# Patient Record
Sex: FEMALE | Race: WHITE
[De-identification: names, ages, dates, MRNs, and addresses within clinical notes are randomized per-mention and may not be internally consistent; named-entity substitution may affect disease eponyms.]

---

## 2018-07-11 ENCOUNTER — HOSPITAL ENCOUNTER (EMERGENCY)
Dept: HOSPITAL 80 - FED | Age: 18
Discharge: HOME | End: 2018-07-11
Payer: COMMERCIAL

## 2018-07-11 VITALS — SYSTOLIC BLOOD PRESSURE: 107 MMHG | DIASTOLIC BLOOD PRESSURE: 65 MMHG

## 2018-07-11 DIAGNOSIS — B34.9: Primary | ICD-10-CM

## 2018-07-11 DIAGNOSIS — E86.9: ICD-10-CM

## 2018-07-11 LAB — PLATELET # BLD: 121 10^3/UL (ref 150–400)

## 2018-07-11 NOTE — EDPHY
HPI/HX/ROS/PE/MDM


Narrative: 





CHIEF COMPLAINT: Headache, fever. 





HPI: 


This patient is a healthy 18 year old female. She complains of fever onset 

three days ago. She has associated headaches and hot and cold flashes. She 

feels fatigued. She denies any observed skin rashes. Her sister had similar 

symptoms last week, but the patient is not sure what her sister had, stating it 

was "some virus". Additionally, the patient reports that she has been drinking 

water frequently but still feels dehydrated. She denies vomiting, diarrhea, 

urinary complaints, cough, chest pain, shortness of breath, or other associated 

symptoms. 





REVIEW OF SYSTEMS:


Aside from elements discussed in the HPI, a comprehensive 10-point review of 

systems was reviewed and is negative.





PMH: Denies.





SOCIAL HISTORY: Visiting from [place]. Works as a . 





PHYSICAL EXAM:


General:Patient is alert, in no acute distress.


ENT:Eyes are normal to inspection.  ENT inspection normal.


Neck: Normal inspection.  Full range of motion.


Respiratory:No respiratory distress.  Breath sounds normal bilaterally.


Cardiovascular: Regular rate and rhythm.  Strong peripheral pulses.  Normal cap 

refill.


Abdomen:The abdomen is nontender to palpation. There are no peritoneal signs. 

There are normal bowel sounds.


Back: Normal to inspection.  No tenderness to palpation.


Skin: Normal color.  No rash.  Warm and dry.


Extremities: Normal appearance.  Full range of motion.


Neuro: Oriented x3.  Normal motor function.  Normal sensory function.





ED Course: 





19 y/o female presents with 3 days history of fever and headache. No neck 

stiffness or photophobia on exam. Plan for labs including CBC, chemistries, flu 

swab, BHCG. Plan to administer 30mg IV Toradol and 1L IVF for symptom relief. 





Flu swab negative. Labs otherwise unremarkable. 





18:57 Reassessed. Patient is feeling better after medications and IVF. Plan to 

discharge home in good condition. Follow up and return precautions discussed. 

She is comfortable with this plan. 


MDM: 





This is a healthy young female who presents with signs and symptoms of a viral 

syndrome.  She has no focal complaints or positive workup to suggest pneumonia, 

UTI.  Her abdomen is completely benign and she denies abdominal pain, making 

appendicitis or other abdominal pathology unlikely.  She has very clear absence 

of any meningeal signs to suggest meningitis.  Her influenza swab is negative 

but I suspect another virus is likely a workup.  The patient was treated with 

Toradol and IV fluids and feels much better.  She declines further workup.  We 

discussed strict return precautions.





- Data Points


Laboratory Results: 


 Laboratory Results





 07/11/18 16:51 





 07/11/18 16:51 





 











  07/11/18 07/11/18 07/11/18





  17:46 17:14 16:51


 


WBC      





    


 


RBC      





    


 


Hgb      





    


 


Hct      





    


 


MCV      





    


 


MCH      





    


 


MCHC      





    


 


RDW      





    


 


Plt Count      





    


 


MPV      





    


 


Neut % (Auto)      





    


 


Lymph % (Auto)      





    


 


Mono % (Auto)      





    


 


Eos % (Auto)      





    


 


Baso % (Auto)      





    


 


Nucleat RBC Rel Count      





    


 


Absolute Neuts (auto)      





    


 


Absolute Lymphs (auto)      





    


 


Absolute Monos (auto)      





    


 


Absolute Eos (auto)      





    


 


Absolute Basos (auto)      





    


 


Absolute Nucleated RBC      





    


 


Immature Gran %      





    


 


Seg Neutrophils %      





    


 


Band Neutrophils %      





    


 


Lymphocytes %      





    


 


Monocytes %      





    


 


Eosinophils %      





    


 


Basophils %      





    


 


Metamyelocytes %      





    


 


Myelocytes %      





    


 


Promyelocytes %      





    


 


Blast Cells %      





    


 


Immature Gran #      





    


 


Absolute Seg Neuts      





    


 


Absolute Band Neuts      





    


 


Absolute Lymphocytes      





    


 


Absolute Monocytes      





    


 


Absolute Eosinophils      





    


 


Absolute Basophils      





    


 


Absolute Metamyelocyte      





    


 


Absolute Myelocytes      





    


 


Absolute Promyelocytes      





    


 


Absolute Plasma Cells      





    


 


Nucleated RBCs      





    


 


RBC/WBC/PLT Morphology      





    


 


Absolute Blast Cells      





    


 


Plasma Cells %      





    


 


Platelet Estimate      





    


 


Sodium      136 mEq/L mEq/L





     (135-145) 


 


Potassium      4.1 mEq/L mEq/L





     (3.3-5.0) 


 


Chloride      107 mEq/L mEq/L





     () 


 


Carbon Dioxide      16 mEq/l L mEq/l





     (22-31) 


 


Anion Gap      13 mEq/L mEq/L





     (8-16) 


 


BUN      9 mg/dL mg/dL





     (7-23) 


 


Creatinine      0.8 mg/dL mg/dL





     (0.6-1.0) 


 


Estimated GFR      > 60 





    


 


Glucose      78 mg/dL mg/dL





     () 


 


Calcium      9.1 mg/dL mg/dL





     (8.5-10.4) 


 


Beta HCG, Qual    NEGATIVE   





    


 


Specimen Hemolysis      107 





    


 


Nasal Influenza A PCR  NEGATIVE FOR FLU A     





   (NEGATIVE)   


 


Nasal Influenza B PCR  NEGATIVE FOR FLU B     





   (NEGATIVE)   














  07/11/18





  16:51


 


WBC  3.82 10^3/uL 10^3/uL





   (3.80-9.50) 


 


RBC  4.38 10^6/uL 10^6/uL





   (4.18-5.33) 


 


Hgb  13.6 g/dL g/dL





   (12.6-16.3) 


 


Hct  39.7 % %





   (38.0-47.0) 


 


MCV  90.6 fL fL





   (81.5-99.8) 


 


MCH  31.1 pg pg





   (27.9-34.1) 


 


MCHC  34.3 g/dL g/dL





   (32.4-36.7) 


 


RDW  12.8 % %





   (11.5-15.2) 


 


Plt Count  121 10^3/uL L 10^3/uL





   (150-400) 


 


MPV  11.4 fL fL





   (8.7-11.7) 


 


Neut % (Auto)  Not Reported 





  


 


Lymph % (Auto)  Not Reported 





  


 


Mono % (Auto)  Not Reported 





  


 


Eos % (Auto)  Not Reported 





  


 


Baso % (Auto)  Not Reported 





  


 


Nucleat RBC Rel Count  Not Reported 





  


 


Absolute Neuts (auto)  Not Reported 





  


 


Absolute Lymphs (auto)  Not Reported 





  


 


Absolute Monos (auto)  Not Reported 





  


 


Absolute Eos (auto)  Not Reported 





  


 


Absolute Basos (auto)  Not Reported 





  


 


Absolute Nucleated RBC  Not Reported 





  


 


Immature Gran %  Not Reported 





  


 


Seg Neutrophils %  58.0 % %





  


 


Band Neutrophils %  24.0 % %





  


 


Lymphocytes %  8.0 % %





  


 


Monocytes %  10.0 % %





  


 


Eosinophils %  0 % %





  


 


Basophils %  0 % %





  


 


Metamyelocytes %  0 % %





  


 


Myelocytes %  0 % %





  


 


Promyelocytes %  0 % %





  


 


Blast Cells %  0 % %





  


 


Immature Gran #  Not Reported 





  


 


Absolute Seg Neuts  2.22 10^/uL 10^/uL





   (1.70-6.50) 


 


Absolute Band Neuts  0.92 10^3/uL H 10^3/uL





   (0.00-0.70) 


 


Absolute Lymphocytes  0.31 10^3/uL L 10^3/uL





   (1.00-3.00) 


 


Absolute Monocytes  0.38 10^3/uL 10^3/uL





   (0.30-0.80) 


 


Absolute Eosinophils  0.00 10^3/uL L 10^3/uL





   (0.03-0.40) 


 


Absolute Basophils  0.00 10^3/uL L 10^3/uL





   (0.02-0.10) 


 


Absolute Metamyelocyte  0.00 10^3/mL 10^3/mL





   (0.00-0.00) 


 


Absolute Myelocytes  0.00 10^3/mL 10^3/mL





   (0.00-0.00) 


 


Absolute Promyelocytes  0.00 10^3/uL 10^3/uL





   (0.00-0.00) 


 


Absolute Plasma Cells  0.00 10^3/uL 10^3/uL





   (0.00-0.00) 


 


Nucleated RBCs  0 /100 WBC /100 WBC





   (0-0) 


 


RBC/WBC/PLT Morphology  NORMAL 





   (NORMAL) 


 


Absolute Blast Cells  0.00 10^3/uL 10^3/uL





   (0.00-0.00) 


 


Plasma Cells %  0 % %





  


 


Platelet Estimate  DECREASED  L 





   (ADEQ) 


 


Sodium  





  


 


Potassium  





  


 


Chloride  





  


 


Carbon Dioxide  





  


 


Anion Gap  





  


 


BUN  





  


 


Creatinine  





  


 


Estimated GFR  





  


 


Glucose  





  


 


Calcium  





  


 


Beta HCG, Qual  





  


 


Specimen Hemolysis  





  


 


Nasal Influenza A PCR  





  


 


Nasal Influenza B PCR  





  











Medications Given: 


 








Discontinued Medications





Sodium Chloride (Ns)  1,000 mls @ 0 mls/hr IV EDNOW ONE; Wide Open


   PRN Reason: Protocol


   Stop: 07/11/18 17:22


   Last Admin: 07/11/18 18:06 Dose:  1,000 mls


Ketorolac Tromethamine (Toradol)  30 mg IVP EDNOW ONE


   Stop: 07/11/18 17:22


   Last Admin: 07/11/18 18:06 Dose:  30 mg








General


Time Seen by Provider: 07/11/18 16:50


Initial Vital Signs: 


 Initial Vital Signs











Temperature (C)  37.9 C   07/11/18 16:15


 


Heart Rate  102 H  07/11/18 16:15


 


Respiratory Rate  18   07/11/18 16:15


 


Blood Pressure  93/66 L  07/11/18 16:15


 


O2 Sat (%)  93   07/11/18 16:15








 











O2 Delivery Mode               Room Air














Allergies/Adverse Reactions: 


 





No Known Allergies Allergy (Unverified 07/11/18 16:15)


 








Home Medications: 














 Medication  Instructions  Recorded


 


Adderall 20 mg (*)  07/11/18














Departure





- Departure


Disposition: Home, Routine, Self-Care


Clinical Impression: 


 Viral syndrome





Condition: Good


Instructions:  Viral Syndrome (ED)


Additional Instructions: 


Use ibuprofen and Tylenol as needed for fever and body aches. 





Follow up with your primary care physician within 2-3 days hours for 

reevaluation.  





Drink plenty of fluids.  





Return to the emergency department for high fever, severe headache or neck pain

, difficulty breathing, abdominal pain, rash or other worsening of condition.


Referrals: 


[facility] CAPONE [Other] - As per Instructions


Report Scribed for: Mendez Brown


Report Scribed by: Elda Guerra


Date of Report: 07/11/18


Time of Report: 17:12


Physician Review and Approval Statement: Portions of this note were transcribed 

by an ED scribe.  I personally performed the history, physical exam, and 

medical decision making; and confirm the accuracy of the information in the 

transcribed note.